# Patient Record
Sex: MALE | Race: OTHER | NOT HISPANIC OR LATINO | Employment: UNEMPLOYED | URBAN - METROPOLITAN AREA
[De-identification: names, ages, dates, MRNs, and addresses within clinical notes are randomized per-mention and may not be internally consistent; named-entity substitution may affect disease eponyms.]

---

## 2024-02-14 ENCOUNTER — HOSPITAL ENCOUNTER (OUTPATIENT)
Dept: LAB | Facility: MEDICAL CENTER | Age: 29
End: 2024-02-14
Attending: PSYCHIATRY & NEUROLOGY
Payer: MEDICAID

## 2024-02-14 ENCOUNTER — OFFICE VISIT (OUTPATIENT)
Dept: NEUROLOGY | Facility: MEDICAL CENTER | Age: 29
End: 2024-02-14
Attending: PSYCHIATRY & NEUROLOGY
Payer: MEDICAID

## 2024-02-14 VITALS
WEIGHT: 205.25 LBS | OXYGEN SATURATION: 97 % | DIASTOLIC BLOOD PRESSURE: 62 MMHG | HEIGHT: 75 IN | BODY MASS INDEX: 25.52 KG/M2 | SYSTOLIC BLOOD PRESSURE: 104 MMHG | TEMPERATURE: 98.4 F | HEART RATE: 98 BPM

## 2024-02-14 DIAGNOSIS — G35 MULTIPLE SCLEROSIS (HCC): ICD-10-CM

## 2024-02-14 DIAGNOSIS — G35 MULTIPLE SCLEROSIS (HCC): Primary | ICD-10-CM

## 2024-02-14 LAB
ALBUMIN SERPL BCP-MCNC: 4.5 G/DL (ref 3.2–4.9)
ALBUMIN/GLOB SERPL: 1.5 G/DL
ALP SERPL-CCNC: 114 U/L (ref 30–99)
ALT SERPL-CCNC: 16 U/L (ref 2–50)
ANION GAP SERPL CALC-SCNC: 14 MMOL/L (ref 7–16)
AST SERPL-CCNC: 19 U/L (ref 12–45)
BASOPHILS # BLD AUTO: 0.5 % (ref 0–1.8)
BASOPHILS # BLD: 0.03 K/UL (ref 0–0.12)
BILIRUB SERPL-MCNC: 0.2 MG/DL (ref 0.1–1.5)
BUN SERPL-MCNC: 12 MG/DL (ref 8–22)
CALCIUM ALBUM COR SERPL-MCNC: 8.7 MG/DL (ref 8.5–10.5)
CALCIUM SERPL-MCNC: 9.1 MG/DL (ref 8.5–10.5)
CHLORIDE SERPL-SCNC: 103 MMOL/L (ref 96–112)
CO2 SERPL-SCNC: 21 MMOL/L (ref 20–33)
CREAT SERPL-MCNC: 0.92 MG/DL (ref 0.5–1.4)
EOSINOPHIL # BLD AUTO: 0.11 K/UL (ref 0–0.51)
EOSINOPHIL NFR BLD: 1.8 % (ref 0–6.9)
ERYTHROCYTE [DISTWIDTH] IN BLOOD BY AUTOMATED COUNT: 38.7 FL (ref 35.9–50)
GFR SERPLBLD CREATININE-BSD FMLA CKD-EPI: 117 ML/MIN/1.73 M 2
GLOBULIN SER CALC-MCNC: 3 G/DL (ref 1.9–3.5)
GLUCOSE SERPL-MCNC: 105 MG/DL (ref 65–99)
HBV SURFACE AB SERPL IA-ACNC: <3.5 MIU/ML (ref 0–10)
HBV SURFACE AG SER QL: NORMAL
HCT VFR BLD AUTO: 48.2 % (ref 42–52)
HCV AB SER QL: NORMAL
HGB BLD-MCNC: 16.4 G/DL (ref 14–18)
IMM GRANULOCYTES # BLD AUTO: 0.01 K/UL (ref 0–0.11)
IMM GRANULOCYTES NFR BLD AUTO: 0.2 % (ref 0–0.9)
LYMPHOCYTES # BLD AUTO: 2 K/UL (ref 1–4.8)
LYMPHOCYTES NFR BLD: 32.8 % (ref 22–41)
MCH RBC QN AUTO: 28.8 PG (ref 27–33)
MCHC RBC AUTO-ENTMCNC: 34 G/DL (ref 32.3–36.5)
MCV RBC AUTO: 84.6 FL (ref 81.4–97.8)
MONOCYTES # BLD AUTO: 0.47 K/UL (ref 0–0.85)
MONOCYTES NFR BLD AUTO: 7.7 % (ref 0–13.4)
NEUTROPHILS # BLD AUTO: 3.48 K/UL (ref 1.82–7.42)
NEUTROPHILS NFR BLD: 57 % (ref 44–72)
NRBC # BLD AUTO: 0 K/UL
NRBC BLD-RTO: 0 /100 WBC (ref 0–0.2)
PLATELET # BLD AUTO: 262 K/UL (ref 164–446)
PMV BLD AUTO: 10.5 FL (ref 9–12.9)
POTASSIUM SERPL-SCNC: 3.6 MMOL/L (ref 3.6–5.5)
PROT SERPL-MCNC: 7.5 G/DL (ref 6–8.2)
RBC # BLD AUTO: 5.7 M/UL (ref 4.7–6.1)
SODIUM SERPL-SCNC: 138 MMOL/L (ref 135–145)
WBC # BLD AUTO: 6.1 K/UL (ref 4.8–10.8)

## 2024-02-14 PROCEDURE — 87340 HEPATITIS B SURFACE AG IA: CPT

## 2024-02-14 PROCEDURE — 86706 HEP B SURFACE ANTIBODY: CPT

## 2024-02-14 PROCEDURE — 80053 COMPREHEN METABOLIC PANEL: CPT

## 2024-02-14 PROCEDURE — 3078F DIAST BP <80 MM HG: CPT | Performed by: PSYCHIATRY & NEUROLOGY

## 2024-02-14 PROCEDURE — 82784 ASSAY IGA/IGD/IGG/IGM EACH: CPT

## 2024-02-14 PROCEDURE — 36415 COLL VENOUS BLD VENIPUNCTURE: CPT

## 2024-02-14 PROCEDURE — 3074F SYST BP LT 130 MM HG: CPT | Performed by: PSYCHIATRY & NEUROLOGY

## 2024-02-14 PROCEDURE — 99205 OFFICE O/P NEW HI 60 MIN: CPT | Performed by: PSYCHIATRY & NEUROLOGY

## 2024-02-14 PROCEDURE — 86480 TB TEST CELL IMMUN MEASURE: CPT

## 2024-02-14 PROCEDURE — 86359 T CELLS TOTAL COUNT: CPT

## 2024-02-14 PROCEDURE — 85025 COMPLETE CBC W/AUTO DIFF WBC: CPT

## 2024-02-14 PROCEDURE — 99202 OFFICE O/P NEW SF 15 MIN: CPT | Performed by: PSYCHIATRY & NEUROLOGY

## 2024-02-14 PROCEDURE — 86787 VARICELLA-ZOSTER ANTIBODY: CPT

## 2024-02-14 PROCEDURE — 86355 B CELLS TOTAL COUNT: CPT

## 2024-02-14 PROCEDURE — 86360 T CELL ABSOLUTE COUNT/RATIO: CPT

## 2024-02-14 PROCEDURE — 86803 HEPATITIS C AB TEST: CPT

## 2024-02-14 PROCEDURE — 86357 NK CELLS TOTAL COUNT: CPT

## 2024-02-14 PROCEDURE — 99417 PROLNG OP E/M EACH 15 MIN: CPT | Performed by: PSYCHIATRY & NEUROLOGY

## 2024-02-14 ASSESSMENT — PATIENT HEALTH QUESTIONNAIRE - PHQ9: CLINICAL INTERPRETATION OF PHQ2 SCORE: 0

## 2024-02-14 NOTE — PROGRESS NOTES
"[facility] NEUROLOGY  MULTIPLE SCLEROSIS & NEUROIMMUNOLOGY  NEW PATIENT VISIT    Referral source: Usama Davis MD    DISEASE SUMMARY:  Principal neurologic diagnosis: MS  Diagnosis of MS: 8/2019  Disease History:  - [id number]: onset of falls related to ?left leg weakness  - 2018: onset of left ankle stiffness and gait dysfunction, consulted w/ neurology, workup included imaging and LP  - 8/2019: diagnosed w/ MS  - 2020: started Tecfidera, stopped after 3-4 months due to elevated liver enzymes  - 6-7/2020: episode of \"MS hug\"  - 11/2020: contracted COVID-19, previous symptoms worsened temporarily  Disease course at onset: RRMS  Current disease course: RRMS  Previous disease therapies:  - Tecfidera: stopped due to elevated liver enzymes  Current disease therapies:  - none, planning to start Ocrevus  Symptomatic therapies:  - none  CSF:  - remote, results unavailable  Other Testing:  - none  MRI head:  -   MRI cervical spine:  -   MRI thoracic spine:  -   Immunizations:  - influenza?:   - Pneumonia?:  - SARS-CoV-2?:   Cancer Screens:  - mammogram:   - PAP?:   - skin check:     CC: \"MS, JCV Ab positive\"    HISTORY OF ILLNESS:  Ruben Travis is a 28 y.o. man with a history most notable for alcohol use disorder and substance use.  Today, he was accompanied by his step-mother, and he provided the following history:    A review of MS-related symptoms was notable for the following:    Fatigue: yes; standing or walking for long distances/times causes fatigue  Weakness: yes; in the lower extremities, fatigable  Numbness: no  Incoordination: yes; involving the left>right lower extremities  Spasms/Spasticity: no  Vision Impairment: yes; without his glasses he can hardly see  Walking/Balance Problems: yes; fatigues easily  Neuralgia: no  Bowel Symptoms: no  Bladder Symptoms: no  Heat Sensitivity: yes;    Depression: no  Cognitive/Memory Problems: yes; his memory is poor, he has to write down tasks  Sexual Dysfunction:  not " assessed  Anxiety: no    MEDICAL AND SURGICAL HISTORY:  Past Medical History:   Diagnosis Date    Corneal abrasion     Hemorrhoids     Multiple sclerosis (HCC)      History reviewed. No pertinent surgical history.    MEDICATIONS:  No current outpatient medications on file.     SOCIAL HISTORY:  Social History     Tobacco Use    Smoking status: Every Day     Types: Cigarettes    Smokeless tobacco: Former   Substance Use Topics    Alcohol use: Not Currently     Comment: sober since: 3/8/2023     Social History     Social History Narrative    Not on file     FAMILY HISTORY:  History reviewed. No pertinent family history.    REVIEW OF SYSTEMS:  A ROS was completed.  Pertinent positives and negatives were included in the HPI, above.  All other systems were reviewed and are negative.    PHYSICAL EXAM:  General/Medical:  - NAD  - hair, skin, nails, and joints were normal    Neuro:  MENTAL STATUS: awake and alert; no deficits of speech or language; oriented to person, place, and time; affect was appropriate to situation; pleasant, cooperative    CRANIAL NERVES:    II: acuity: J1+/J1+ with glasses, fields: intact to confrontation, pupils: 4/4 to 3/3 without a relative afferent pupillary defect, discs: sharp    III/IV/VI: versions: intact without nystagmus    V: facial sensation: symmetric to light touch    VII: facial expression: symmetric    VIII: hearing: intact to finger rub    IX/X: palate: elevates symmetrically    XI: shoulder shrug: symmetric    XII: tongue: midline    MOTOR:  - bulk: normal throughout  - tone: NT  Upper Extremity Strength  (R/L)    5/5   Elbow flexion 5/5   Elbow extension 5/5   Shoulder abduction 5/5     Lower Extremity Strength  (R/L)   Hip flexion 5/<3   Knee extension 5/5   Knee flexion 5/5   Ankle dorsiflexion 5/4   Ankle plantarflexion 5/5     - heel-walk: intact  - toe-walk: intact  - pronator drift: absent  - abnormal movements: none    SENSATION:  - light touch: grossly intact over the  "upper- and lower extremities  - vibration (R/L, seconds): 22/18 at the great toes  - pinprick: NT  - proprioception: NT  - Romberg: absent    COORDINATION:  - finger to nose: normal, no ataxia on exam  - finger tapping: rapid and accurate, bilaterally    REFLEXES:  Reflex Right Left   BR 1+ 2+   Biceps 2+ 2+   Triceps 2+ 2+   Patellae 2+ 2+   Achilles 2+ 2+   Toes NT NT     GAIT:  - narrow base and normal  - tandem gait: intact    QUANTITATIVE SCORES:  Timed 25-foot walk (sec): 5.0 on 2/14/2024.  Assistive device: none    REVIEW OF IMAGING STUDIES:  No data available.    REVIEW OF LABORATORY STUDIES:  No data available.    ASSESSMENT:  Ruben Travis is a 28 y.o. man with MS.  I have asked my medical assistant to obtain outside images.  Given the presence of multiple poor prognostic factors (male sex, current degree of disability, presence of [reportedly] cord lesions, etc.) I am very much in favor of using a high-intensity agent.  The terms of his judicial/legal status prevent the usage of an injectable medication at home we agreed to start Ocrevus.  Plans/recommendations as follows:    PLAN:  MS:  Orders Placed This Encounter    CBC with differential    complete metabolic panel    hepatitis B surface Ab    hepatitis b surface antigen    hepatitis C antibody    IgA    IgG    IgM    lymphocyte subsets (B & T cells)    quantiferon gold plus TB (4 tube)    varicella zoster IgG Ab     - Prevnar 20 vaccine  - obtain outside images    Follow-Up:  - Return in about 3 months (around 5/14/2024).    Signed: Aidan Chapa M.D.    BILLING DOCUMENTATION:   I spent 96 minutes reviewing the medical record, interviewing and examining the patient, discussing my impression (see \"assessment\" above), and coordinating care.  "

## 2024-02-16 LAB
ANNOTATION COMMENT IMP: NORMAL
CD19 CELLS NFR SPEC: 19 % (ref 6–23)
CD3 CELLS # BLD: 1555 CELLS/UL (ref 570–2400)
CD3 CELLS NFR SPEC: 74 % (ref 62–87)
CD3+CD4+ CELLS # BLD: 984 CELLS/UL (ref 430–1800)
CD3+CD4+ CELLS NFR BLD: 47 % (ref 32–64)
CD3+CD4+ CELLS/CD3+CD8+ CLL BLD: 1.81 RATIO (ref 0.8–3.9)
CD3+CD8+ CELLS # BLD: 541 CELLS/UL (ref 210–1200)
CD3+CD8+ CELLS NFR SPEC: 26 % (ref 15–46)
CD3-CD16+CD56+ CELLS # SPEC: 137 CELLS/UL (ref 78–470)
CD3-CD16+CD56+ CELLS NFR SPEC: 7 % (ref 4–26)
CELLS.CD3-CD19+ [#/VOLUME] IN BLOOD: 403 CELLS/UL (ref 91–610)
GAMMA INTERFERON BACKGROUND BLD IA-ACNC: 0.05 IU/ML
IGA SERPL-MCNC: 215 MG/DL (ref 68–408)
IGG SERPL-MCNC: 1015 MG/DL (ref 768–1632)
IGM SERPL-MCNC: 93 MG/DL (ref 35–263)
M TB IFN-G BLD-IMP: NEGATIVE
M TB IFN-G CD4+ BCKGRND COR BLD-ACNC: 0 IU/ML
MITOGEN IGNF BCKGRD COR BLD-ACNC: >10 IU/ML
QFT TB2 - NIL TBQ2: 0.01 IU/ML
VZV IGG SER IA-ACNC: 3282 IV

## 2024-04-25 ENCOUNTER — OFFICE VISIT (OUTPATIENT)
Dept: NEUROLOGY | Facility: MEDICAL CENTER | Age: 29
End: 2024-04-25
Attending: PSYCHIATRY & NEUROLOGY
Payer: MEDICAID

## 2024-04-25 VITALS
HEART RATE: 89 BPM | HEIGHT: 75 IN | BODY MASS INDEX: 23.44 KG/M2 | WEIGHT: 188.49 LBS | SYSTOLIC BLOOD PRESSURE: 122 MMHG | TEMPERATURE: 98.2 F | DIASTOLIC BLOOD PRESSURE: 68 MMHG | OXYGEN SATURATION: 96 %

## 2024-04-25 DIAGNOSIS — G35 MULTIPLE SCLEROSIS (HCC): Primary | ICD-10-CM

## 2024-04-25 PROCEDURE — 99211 OFF/OP EST MAY X REQ PHY/QHP: CPT | Performed by: PSYCHIATRY & NEUROLOGY

## 2024-04-25 PROCEDURE — 3078F DIAST BP <80 MM HG: CPT | Performed by: PSYCHIATRY & NEUROLOGY

## 2024-04-25 PROCEDURE — 3074F SYST BP LT 130 MM HG: CPT | Performed by: PSYCHIATRY & NEUROLOGY

## 2024-04-25 PROCEDURE — 99213 OFFICE O/P EST LOW 20 MIN: CPT | Performed by: PSYCHIATRY & NEUROLOGY

## 2024-04-25 ASSESSMENT — FIBROSIS 4 INDEX: FIB4 SCORE: 0.49

## 2024-04-25 NOTE — LETTER
April 25, 2024    To Whom It May Concern:    This is confirmation that Ruben Travis attended his scheduled appointment with Aidan Chapa M.D. on 4/25/24.    He carries a diagnosis of multiple sclerosis (MS).  This is an immune-mediated condition characterized by inflammatory lesions and neurologic deficits.  His neurologic exam is notable for weakness in the lower extremities.  Persons with MS often report fatigability, and Mr. Travis experiences this as well.  This symptom prevents him from working.    We are currently trying to initiate treatment in order to reduce his risk of additional inflammatory lesions.    If you have any questions please do not hesitate to call me at the phone number listed below.    Sincerely,      Aidan Chapa M.D.  [phone]

## 2024-04-25 NOTE — PROGRESS NOTES
"[facility]  MULTIPLE SCLEROSIS & NEUROIMMUNOLOGY  FOLLOW-UP VISIT    DISEASE SUMMARY:  Principal neurologic diagnosis: MS  Diagnosis of MS: 8/2019  Disease History:  - [id number]: onset of falls related to ?left leg weakness  - 2018: onset of left ankle stiffness and gait dysfunction, consulted w/ neurology, workup included imaging and LP  - 8/2019: diagnosed w/ MS  - 2020: started Tecfidera, stopped after 3-4 months due to elevated liver enzymes  - 6-7/2020: episode of \"MS hug\"  - 11/2020: contracted COVID-19, previous symptoms worsened temporarily  Disease course at onset: RRMS  Current disease course: RRMS  Previous disease therapies:  - Tecfidera: stopped due to elevated liver enzymes  Current disease therapies:  - none, planning to start Ocrevus  Symptomatic therapies:  - none  CSF:  - remote, results unavailable  Other Testing:  - none  MRI head:  -   MRI cervical spine:  -   MRI thoracic spine:  -   Immunizations:  - influenza?:   - Pneumonia?: 4/11/2024  - SARS-CoV-2?:   Cancer Screens:  - mammogram:   - PAP?:   - skin check:     CC: MS    INTERVAL HISTORY:  Ruben Travis is a 28 y.o. man with MS.  I last saw him in the [facility] on 2/14/2024.  At that time I recommended he start Ocrevus.  Today, he was accompanied by his mother, and he provided the following interval history:    Ruben received his Prevnar 20 vaccine several days ago, and he completed pre-treatment blood work.    He has not yet started Ocrevus.  He declines unknown incoming calls and refuses to set up his voicemail.    MEDICATIONS:  No current outpatient medications on file.     MEDICAL, SOCIAL, AND FAMILY HISTORY:  There is no change in the patient's ROS or medical, social, or family histories since the previous visit on 2/14/2024.    REVIEW OF SYSTEMS:  A ROS was completed.  Pertinent positives and negatives were included in the HPI, above.  All other systems were reviewed and are negative.    PHYSICAL EXAM:  General/Medical:  - " NAD    Neuro:  MENTAL STATUS: awake and alert; no deficits of speech or language; oriented to conversation; affect was appropriate to situation; pleasant, cooperative    CRANIAL NERVES:    II: acuity: NT, fields: NT, pupils: NT, discs: NT    III/IV/VI: versions: grossly intact    V: facial sensation: NT    VII: facial expression: symmetric    VIII: hearing: intact to voice    IX/X: palate: NT    XI: shoulder shrug: NT    XII: tongue: NT    MOTOR:  - bulk: NT  - tone: NT  Upper Extremity Strength (R/L)    NT   Elbow flexion NT   Elbow extension NT   Shoulder abduction NT     Lower Extremity Strength  (R/L)   Hip flexion NT   Knee extension NT   Knee flexion NT   Ankle dorsiflexion NT   Ankle plantarflexion NT     - pronator drift: NT  - abnormal movements: none    SENSATION:  - light touch: NT  - vibration (R/L, seconds): NT at the great toes  - pinprick: NT  - proprioception: NT  - Romberg: NT    COORDINATION:  - finger to nose: NT  - finger tapping: NT    REFLEXES:  Reflex Right Left   BR NT NT   Biceps NT NT   Triceps NT NT   Patellae NT NT   Achilles NT NT   Toes NT NT     GAIT:  - NT    REVIEW OF IMAGING STUDIES:  No data available.    REVIEW OF LABORATORY STUDIES:  2/14/2024:  - Ocrevus pre-treatment labs: WNL    ASSESSMENT:  Ruben Travis is a 28 y.o. man with MS.  Plans/recommendations as follows:    PLAN:  MS:  - letter for court completed  - re-submitted start paperwork with mother's phone number listed  - MRI brain and cervical spine w/o and w/ contrast  - updated our system to include mother's phone number as primary    Follow-Up:  - Return in about 3 months (around 7/25/2024).    Signed: Aidan Chapa M.D.

## 2024-04-29 DIAGNOSIS — G35 MULTIPLE SCLEROSIS (HCC): ICD-10-CM

## 2024-04-29 RX ORDER — 0.9 % SODIUM CHLORIDE 0.9 %
10 VIAL (ML) INJECTION PRN
OUTPATIENT
Start: 2024-05-06

## 2024-04-29 RX ORDER — SODIUM CHLORIDE 9 MG/ML
INJECTION, SOLUTION INTRAVENOUS CONTINUOUS
OUTPATIENT
Start: 2024-05-06

## 2024-04-29 RX ORDER — ONDANSETRON 2 MG/ML
8 INJECTION INTRAMUSCULAR; INTRAVENOUS EVERY 4 HOURS PRN
OUTPATIENT
Start: 2024-05-06

## 2024-04-29 RX ORDER — DIPHENHYDRAMINE HCL 25 MG
50 TABLET ORAL ONCE
OUTPATIENT
Start: 2024-05-06 | End: 2024-05-06

## 2024-04-29 RX ORDER — DIPHENHYDRAMINE HYDROCHLORIDE 50 MG/ML
50 INJECTION INTRAMUSCULAR; INTRAVENOUS PRN
OUTPATIENT
Start: 2024-05-06

## 2024-04-29 RX ORDER — EPINEPHRINE 1 MG/ML(1)
0.5 AMPUL (ML) INJECTION PRN
OUTPATIENT
Start: 2024-05-06

## 2024-04-29 RX ORDER — 0.9 % SODIUM CHLORIDE 0.9 %
3 VIAL (ML) INJECTION PRN
OUTPATIENT
Start: 2024-05-06

## 2024-04-29 RX ORDER — METHYLPREDNISOLONE SODIUM SUCCINATE 125 MG/2ML
125 INJECTION, POWDER, LYOPHILIZED, FOR SOLUTION INTRAMUSCULAR; INTRAVENOUS PRN
OUTPATIENT
Start: 2024-05-06

## 2024-04-29 RX ORDER — METHYLPREDNISOLONE SODIUM SUCCINATE 125 MG/2ML
100 INJECTION, POWDER, LYOPHILIZED, FOR SOLUTION INTRAMUSCULAR; INTRAVENOUS ONCE
OUTPATIENT
Start: 2024-05-06

## 2024-04-29 RX ORDER — 0.9 % SODIUM CHLORIDE 0.9 %
VIAL (ML) INJECTION PRN
OUTPATIENT
Start: 2024-05-06

## 2024-04-29 RX ORDER — ACETAMINOPHEN 325 MG/1
650 TABLET ORAL ONCE
OUTPATIENT
Start: 2024-05-06

## 2024-05-13 ENCOUNTER — TELEPHONE (OUTPATIENT)
Dept: NEUROLOGY | Facility: MEDICAL CENTER | Age: 29
End: 2024-05-13
Payer: MEDICAID

## 2024-05-13 NOTE — TELEPHONE ENCOUNTER
[id number] set up for tomorrow between 1pm-2pm. They will be calling your personal number.   -KINA Agrawal.

## 2024-05-13 NOTE — TELEPHONE ENCOUNTER
[facility] called and and stated that the auth for patients Ocrevus has been denied and is requiring a [id number]. Please call [phone] Auth #:[id number]  -KINA Agrawal.

## 2024-06-15 ENCOUNTER — HOSPITAL ENCOUNTER (OUTPATIENT)
Dept: RADIOLOGY | Facility: MEDICAL CENTER | Age: 29
End: 2024-06-15
Attending: PSYCHIATRY & NEUROLOGY
Payer: MEDICAID

## 2024-06-15 DIAGNOSIS — G35 MULTIPLE SCLEROSIS (HCC): ICD-10-CM

## 2024-06-15 PROCEDURE — 700117 HCHG RX CONTRAST REV CODE 255: Mod: UD | Performed by: PSYCHIATRY & NEUROLOGY

## 2024-06-15 PROCEDURE — 70553 MRI BRAIN STEM W/O & W/DYE: CPT

## 2024-06-15 PROCEDURE — A9579 GAD-BASE MR CONTRAST NOS,1ML: HCPCS | Mod: UD | Performed by: PSYCHIATRY & NEUROLOGY

## 2024-06-15 PROCEDURE — 72156 MRI NECK SPINE W/O & W/DYE: CPT

## 2024-06-15 RX ADMIN — GADOTERIDOL 17 ML: 279.3 INJECTION, SOLUTION INTRAVENOUS at 09:44

## 2024-07-25 ENCOUNTER — OFFICE VISIT (OUTPATIENT)
Dept: NEUROLOGY | Facility: MEDICAL CENTER | Age: 29
End: 2024-07-25
Attending: PSYCHIATRY & NEUROLOGY
Payer: MEDICAID

## 2024-07-25 VITALS
BODY MASS INDEX: 21.44 KG/M2 | HEART RATE: 70 BPM | WEIGHT: 172.4 LBS | TEMPERATURE: 97.9 F | SYSTOLIC BLOOD PRESSURE: 112 MMHG | OXYGEN SATURATION: 95 % | DIASTOLIC BLOOD PRESSURE: 58 MMHG | HEIGHT: 75 IN

## 2024-07-25 DIAGNOSIS — G35 MULTIPLE SCLEROSIS (HCC): ICD-10-CM

## 2024-07-25 PROCEDURE — 3078F DIAST BP <80 MM HG: CPT | Performed by: PSYCHIATRY & NEUROLOGY

## 2024-07-25 PROCEDURE — 99211 OFF/OP EST MAY X REQ PHY/QHP: CPT | Performed by: PSYCHIATRY & NEUROLOGY

## 2024-07-25 PROCEDURE — 99215 OFFICE O/P EST HI 40 MIN: CPT | Performed by: PSYCHIATRY & NEUROLOGY

## 2024-07-25 PROCEDURE — 3074F SYST BP LT 130 MM HG: CPT | Performed by: PSYCHIATRY & NEUROLOGY

## 2024-07-25 PROCEDURE — G2212 PROLONG OUTPT/OFFICE VIS: HCPCS | Performed by: PSYCHIATRY & NEUROLOGY

## 2024-07-25 ASSESSMENT — FIBROSIS 4 INDEX: FIB4 SCORE: 0.49

## 2024-10-31 ENCOUNTER — OFFICE VISIT (OUTPATIENT)
Dept: NEUROLOGY | Facility: MEDICAL CENTER | Age: 29
End: 2024-10-31
Attending: PSYCHIATRY & NEUROLOGY
Payer: MEDICAID

## 2024-10-31 VITALS
HEIGHT: 75 IN | TEMPERATURE: 98.3 F | SYSTOLIC BLOOD PRESSURE: 102 MMHG | OXYGEN SATURATION: 97 % | WEIGHT: 164 LBS | RESPIRATION RATE: 16 BRPM | HEART RATE: 78 BPM | BODY MASS INDEX: 20.39 KG/M2 | DIASTOLIC BLOOD PRESSURE: 62 MMHG

## 2024-10-31 DIAGNOSIS — G35 MULTIPLE SCLEROSIS (HCC): ICD-10-CM

## 2024-10-31 PROCEDURE — 99211 OFF/OP EST MAY X REQ PHY/QHP: CPT | Performed by: PSYCHIATRY & NEUROLOGY

## 2024-10-31 RX ORDER — TERBINAFINE HYDROCHLORIDE 250 MG/1
250 TABLET ORAL DAILY
COMMUNITY

## 2024-10-31 RX ORDER — ROSUVASTATIN CALCIUM 10 MG/1
10 TABLET, COATED ORAL DAILY
COMMUNITY

## 2024-10-31 ASSESSMENT — PATIENT HEALTH QUESTIONNAIRE - PHQ9
SUM OF ALL RESPONSES TO PHQ QUESTIONS 1-9: 7
5. POOR APPETITE OR OVEREATING: 0 - NOT AT ALL
CLINICAL INTERPRETATION OF PHQ2 SCORE: 2

## 2024-10-31 ASSESSMENT — FIBROSIS 4 INDEX: FIB4 SCORE: 0.51

## 2025-03-12 ENCOUNTER — TELEPHONE (OUTPATIENT)
Dept: NEUROLOGY | Facility: MEDICAL CENTER | Age: 30
End: 2025-03-12
Payer: MEDICAID

## 2025-03-12 NOTE — TELEPHONE ENCOUNTER
Yanelis from [facility] called to get information to authorize patients Ocrevus. Fax sent.  Ph. [phone] opt. 2  Fx. [phone]  Daniel Coleman, Med Ass't

## 2025-04-30 ENCOUNTER — OFFICE VISIT (OUTPATIENT)
Dept: NEUROLOGY | Facility: MEDICAL CENTER | Age: 30
End: 2025-04-30
Attending: PSYCHIATRY & NEUROLOGY
Payer: MEDICAID

## 2025-04-30 ENCOUNTER — HOSPITAL ENCOUNTER (OUTPATIENT)
Dept: LAB | Facility: MEDICAL CENTER | Age: 30
End: 2025-04-30
Attending: PSYCHIATRY & NEUROLOGY
Payer: MEDICAID

## 2025-04-30 VITALS
HEART RATE: 68 BPM | SYSTOLIC BLOOD PRESSURE: 114 MMHG | BODY MASS INDEX: 20.5 KG/M2 | OXYGEN SATURATION: 96 % | RESPIRATION RATE: 14 BRPM | DIASTOLIC BLOOD PRESSURE: 72 MMHG | TEMPERATURE: 98.2 F | HEIGHT: 75 IN | WEIGHT: 164.9 LBS

## 2025-04-30 DIAGNOSIS — G35 MULTIPLE SCLEROSIS (HCC): ICD-10-CM

## 2025-04-30 DIAGNOSIS — G35 MULTIPLE SCLEROSIS (HCC): Primary | ICD-10-CM

## 2025-04-30 PROCEDURE — 86357 NK CELLS TOTAL COUNT: CPT

## 2025-04-30 PROCEDURE — 86360 T CELL ABSOLUTE COUNT/RATIO: CPT

## 2025-04-30 PROCEDURE — 82784 ASSAY IGA/IGD/IGG/IGM EACH: CPT

## 2025-04-30 PROCEDURE — 3078F DIAST BP <80 MM HG: CPT | Performed by: PSYCHIATRY & NEUROLOGY

## 2025-04-30 PROCEDURE — 99211 OFF/OP EST MAY X REQ PHY/QHP: CPT | Mod: 25 | Performed by: PSYCHIATRY & NEUROLOGY

## 2025-04-30 PROCEDURE — 99214 OFFICE O/P EST MOD 30 MIN: CPT | Performed by: PSYCHIATRY & NEUROLOGY

## 2025-04-30 PROCEDURE — 3074F SYST BP LT 130 MM HG: CPT | Performed by: PSYCHIATRY & NEUROLOGY

## 2025-04-30 PROCEDURE — 86359 T CELLS TOTAL COUNT: CPT

## 2025-04-30 PROCEDURE — 86355 B CELLS TOTAL COUNT: CPT

## 2025-04-30 PROCEDURE — 36415 COLL VENOUS BLD VENIPUNCTURE: CPT

## 2025-04-30 ASSESSMENT — PATIENT HEALTH QUESTIONNAIRE - PHQ9: CLINICAL INTERPRETATION OF PHQ2 SCORE: 0

## 2025-04-30 ASSESSMENT — FIBROSIS 4 INDEX: FIB4 SCORE: 0.51

## 2025-04-30 NOTE — PROGRESS NOTES
"[facility]  MULTIPLE SCLEROSIS & NEUROIMMUNOLOGY  FOLLOW-UP VISIT    DISEASE SUMMARY:  Principal neurologic diagnosis: MS  Diagnosis of MS: 8/2019  Disease History:  - [id number]: onset of falls related to ?left leg weakness  - 2018: onset of left ankle stiffness and gait dysfunction, consulted w/ neurology, workup included imaging and LP  - 8/2019: diagnosed w/ MS  - 2020: started Tecfidera, stopped after 3-4 months due to elevated liver enzymes  - 6-7/2020: episode of \"MS hug\"  - 11/2020: contracted COVID-19, previous symptoms worsened temporarily  - 8/2024: Ocrevus first infusion, experienced burning sensation in his sinuses and back of throat  Disease course at onset: RRMS  Current disease course: RRMS  Previous disease therapies:  - Tecfidera: stopped due to elevated liver enzymes  Current disease therapies:  - Ocrevus  Symptomatic therapies:  - none  CSF:  - remote, results unavailable  Other Testing:  - none  MRI head:  - 6/15/2024: \"white matter disease... no enhancement...\"  MRI cervical spine:  - 6/15/2024: \"single rounded spinal cord lesion at the level of T1 concerning for demyelinating lesion... questionable enhancement associated with this lesion...\"  MRI thoracic spine:  -   Immunizations:  - influenza?:   - Pneumonia?: 4/11/2024  - SARS-CoV-2?:   Cancer Screens:  - mammogram:   - PAP?:   - skin check:     CC: MS    INTERVAL HISTORY:  Ruben Travis is a 29 y.o. man with MS.  I last saw him in the MS Clinic on 10/31/2024.  At that time I recommended he continue Ocrevus.  Today, he was unaccompanied, and he provided the following interval history:    Since the last visit he has not experienced any new neurologic symptoms.  He tolerates Ocrevus well.  He had a cold recently but no severe infections.    A review of MS-related symptoms was notable for the following:    Fatigue: yes, but not usually  Weakness: yes, involving the left upper- and lower extremities  Numbness: none  Incoordination: " none  Spasms/Spasticity: yes, jerks with sleeping involving the R<L side of the body  Vision Impairment: yes, wears glasses  Walking/Balance Problems: he has to brace himself against the wall while using the urinal  Neuralgia: none  Bowel Symptoms: none  Bladder Symptoms: frequency: yes, though he drinks a lot of fluid  Heat Sensitivity: yes  Depression: no, he is hopeful for the future  Cognitive/Memory Problems: no  Sexual Dysfunction: yes, difficulty obtaining/maintaining an erection (upon further discussion, he generally masturbates to orgasm 3 times daily; his therapist knows about this and is addressing it)  Anxiety: none    He violated drug court and tested positive for alcohol and LSD.    He has blacked out twice.  The first was related to alcohol usage.      MEDICATIONS:  Current Outpatient Medications   Medication Sig    rosuvastatin (CRESTOR) 10 MG Tab Take 10 mg by mouth every day.    terbinafine (LAMISIL) 250 MG Tab Take 250 mg by mouth every day. (Patient not taking: Reported on 4/30/2025)     MEDICAL, SOCIAL, AND FAMILY HISTORY:  There is no change in the patient's ROS or medical, social, or family histories since the previous visit on 10/31/2024.    REVIEW OF SYSTEMS:  A ROS was completed.  Pertinent positives and negatives were included in the HPI, above.  All other systems were reviewed and are negative.    PHYSICAL EXAM:  General/Medical:  - NAD  - ankle monitor in place on the left    Neuro:  MENTAL STATUS: awake and alert; no deficits of speech or language; oriented to conversation; affect was appropriate to situation; pleasant, cooperative    CRANIAL NERVES:    II: acuity: NT, fields: NT, pupils: NT, discs: NT    III/IV/VI: versions: grossly intact    V: facial sensation: NT    VII: facial expression: symmetric    VIII: hearing: intact to voice    IX/X: palate: NT    XI: shoulder shrug: NT    XII: tongue: NT    MOTOR:  - bulk: NT  - tone: NT  Upper Extremity Strength (R/L)    NT   Elbow  "flexion NT   Elbow extension NT   Shoulder abduction NT     Lower Extremity Strength  (R/L)   Hip flexion NT   Knee extension NT   Knee flexion NT   Ankle dorsiflexion NT   Ankle plantarflexion NT     - heel-walk: NT  - toe-walk: NT  - pronator drift: absent  - abnormal movements: none    SENSATION:  - light touch: NT  - vibration (R/L, seconds): NT at the great toes  - pinprick: NT  - proprioception: NT  - Romberg: NT    COORDINATION:  - finger to nose: NT  - finger tapping: NT    REFLEXES:  Reflex Right Left   BR NT NT   Biceps NT NT   Triceps NT NT   Patellae NT NT   Achilles NT NT   Toes NT NT     GAIT:  - NT    REVIEW OF IMAGING STUDIES:  I have summarized interval data above.    REVIEW OF LABORATORY STUDIES:  No additional data since the last visit.    ASSESSMENT:  Ruben Travis is a 29 y.o. man with MS.  He has tolerated Ocrevus reasonably well.  Plans/recommendations as follows:    PLAN:  MS:  - continue Ocrevus  - repeat (new baseline) imaging  - drug monitoring labs    Follow-Up:  - Return in about 6 months (around 10/30/2025).    Signed: Aidan Chapa M.D.    BILLING DOCUMENTATION:   I spent 31 minutes reviewing the medical record, interviewing and examining the patient, discussing my impression (see \"assessment\" above), and coordinating care.    "

## 2025-05-02 ENCOUNTER — PATIENT MESSAGE (OUTPATIENT)
Dept: NEUROLOGY | Facility: MEDICAL CENTER | Age: 30
End: 2025-05-02
Payer: MEDICAID

## 2025-05-02 LAB
ANNOTATION COMMENT IMP: ABNORMAL
CD19 CELLS NFR SPEC: 0 % (ref 6–23)
CD3 CELLS # BLD: 1917 CELLS/UL (ref 570–2400)
CD3 CELLS NFR SPEC: 85 % (ref 62–87)
CD3+CD4+ CELLS # BLD: 1176 CELLS/UL (ref 430–1800)
CD3+CD4+ CELLS NFR BLD: 52 % (ref 32–64)
CD3+CD4+ CELLS/CD3+CD8+ CLL BLD: 1.62 RATIO (ref 0.8–3.9)
CD3+CD8+ CELLS # BLD: 728 CELLS/UL (ref 210–1200)
CD3+CD8+ CELLS NFR SPEC: 32 % (ref 15–46)
CD3-CD16+CD56+ CELLS # SPEC: 327 CELLS/UL (ref 78–470)
CD3-CD16+CD56+ CELLS NFR SPEC: 15 % (ref 4–26)
CELLS.CD3-CD19+ [#/VOLUME] IN BLOOD: 0 CELLS/UL (ref 91–610)
IGA SERPL-MCNC: 205 MG/DL (ref 68–408)
IGG SERPL-MCNC: 1027 MG/DL (ref 768–1632)
IGM SERPL-MCNC: 50 MG/DL (ref 35–263)

## 2025-06-10 ENCOUNTER — HOSPITAL ENCOUNTER (OUTPATIENT)
Dept: RADIOLOGY | Facility: MEDICAL CENTER | Age: 30
End: 2025-06-10
Attending: PSYCHIATRY & NEUROLOGY
Payer: MEDICAID

## 2025-06-10 DIAGNOSIS — G35 MULTIPLE SCLEROSIS (HCC): ICD-10-CM

## 2025-06-10 PROCEDURE — 700117 HCHG RX CONTRAST REV CODE 255: Mod: JZ,UD | Performed by: PSYCHIATRY & NEUROLOGY

## 2025-06-10 PROCEDURE — A9579 GAD-BASE MR CONTRAST NOS,1ML: HCPCS | Mod: JZ,UD | Performed by: PSYCHIATRY & NEUROLOGY

## 2025-06-10 PROCEDURE — 70553 MRI BRAIN STEM W/O & W/DYE: CPT

## 2025-06-10 RX ADMIN — GADOTERIDOL 15 ML: 279.3 INJECTION, SOLUTION INTRAVENOUS at 18:45

## 2025-06-16 RX ORDER — GADOTERIDOL 279.3 MG/ML
15 INJECTION INTRAVENOUS ONCE
Status: COMPLETED | OUTPATIENT
Start: 2025-06-10 | End: 2025-06-10

## 2025-06-23 RX ORDER — LORAZEPAM 2 MG/ML
0.5 INJECTION INTRAMUSCULAR PRN
OUTPATIENT
Start: 2025-06-24

## 2025-06-23 RX ORDER — ALBUTEROL SULFATE 5 MG/ML
2.5 SOLUTION RESPIRATORY (INHALATION) PRN
OUTPATIENT
Start: 2025-06-24

## 2025-06-23 RX ORDER — DIPHENHYDRAMINE HCL 25 MG
50 TABLET ORAL ONCE
OUTPATIENT
Start: 2025-06-24 | End: 2025-06-24

## 2025-06-23 RX ORDER — METHYLPREDNISOLONE SODIUM SUCCINATE 125 MG/2ML
125 INJECTION, POWDER, LYOPHILIZED, FOR SOLUTION INTRAMUSCULAR; INTRAVENOUS PRN
OUTPATIENT
Start: 2025-06-24

## 2025-06-23 RX ORDER — EPINEPHRINE 1 MG/ML(1)
0.5 AMPUL (ML) INJECTION PRN
OUTPATIENT
Start: 2025-06-24

## 2025-06-23 RX ORDER — ONDANSETRON 2 MG/ML
8 INJECTION INTRAMUSCULAR; INTRAVENOUS EVERY 4 HOURS PRN
OUTPATIENT
Start: 2025-06-24

## 2025-06-23 RX ORDER — LORAZEPAM 0.5 MG/1
0.5 TABLET ORAL PRN
OUTPATIENT
Start: 2025-06-24

## 2025-06-23 RX ORDER — SODIUM CHLORIDE 9 MG/ML
INJECTION, SOLUTION INTRAVENOUS CONTINUOUS
OUTPATIENT
Start: 2025-06-24

## 2025-06-23 RX ORDER — MEPERIDINE HYDROCHLORIDE 25 MG/ML
25 INJECTION INTRAMUSCULAR; INTRAVENOUS; SUBCUTANEOUS PRN
OUTPATIENT
Start: 2025-06-24

## 2025-06-23 RX ORDER — ONDANSETRON 8 MG/1
8 TABLET, ORALLY DISINTEGRATING ORAL PRN
OUTPATIENT
Start: 2025-06-24

## 2025-06-23 RX ORDER — METHYLPREDNISOLONE SODIUM SUCCINATE 125 MG/2ML
100 INJECTION, POWDER, LYOPHILIZED, FOR SOLUTION INTRAMUSCULAR; INTRAVENOUS ONCE
OUTPATIENT
Start: 2025-06-24

## 2025-06-23 RX ORDER — 0.9 % SODIUM CHLORIDE 0.9 %
VIAL (ML) INJECTION PRN
OUTPATIENT
Start: 2025-06-24

## 2025-06-23 RX ORDER — 0.9 % SODIUM CHLORIDE 0.9 %
3 VIAL (ML) INJECTION PRN
OUTPATIENT
Start: 2025-06-24

## 2025-06-23 RX ORDER — ACETAMINOPHEN 325 MG/1
650 TABLET ORAL ONCE
OUTPATIENT
Start: 2025-06-24

## 2025-06-23 RX ORDER — ACETAMINOPHEN 325 MG/1
650 TABLET ORAL PRN
OUTPATIENT
Start: 2025-06-24

## 2025-06-23 RX ORDER — 0.9 % SODIUM CHLORIDE 0.9 %
10 VIAL (ML) INJECTION PRN
OUTPATIENT
Start: 2025-06-24

## 2025-06-23 RX ORDER — DIPHENHYDRAMINE HYDROCHLORIDE 50 MG/ML
25 INJECTION, SOLUTION INTRAMUSCULAR; INTRAVENOUS PRN
OUTPATIENT
Start: 2025-06-24

## 2025-06-23 RX ORDER — ONDANSETRON 2 MG/ML
8 INJECTION INTRAMUSCULAR; INTRAVENOUS PRN
OUTPATIENT
Start: 2025-06-24

## 2025-06-23 RX ORDER — SODIUM CHLORIDE 9 MG/ML
1000 INJECTION, SOLUTION INTRAVENOUS PRN
OUTPATIENT
Start: 2025-06-24

## 2025-08-08 ENCOUNTER — TELEPHONE (OUTPATIENT)
Dept: NEUROLOGY | Facility: MEDICAL CENTER | Age: 30
End: 2025-08-08
Payer: MEDICAID

## 2025-08-14 ENCOUNTER — SPECIALTY PHARMACY (OUTPATIENT)
Dept: NEUROLOGY | Facility: MEDICAL CENTER | Age: 30
End: 2025-08-14
Payer: MEDICAID

## 2025-08-14 DIAGNOSIS — G35 MULTIPLE SCLEROSIS (HCC): Primary | ICD-10-CM

## 2025-08-14 RX ORDER — OCRELIZUMAB 300 MG/10ML
600 INJECTION INTRAVENOUS
Qty: 20 ML | Refills: 1 | Status: SHIPPED | OUTPATIENT
Start: 2025-08-14 | End: 2026-02-11

## 2025-10-01 ENCOUNTER — APPOINTMENT (OUTPATIENT)
Dept: NEUROLOGY | Facility: MEDICAL CENTER | Age: 30
End: 2025-10-01
Attending: PSYCHIATRY & NEUROLOGY
Payer: MEDICAID